# Patient Record
Sex: MALE | Race: WHITE | NOT HISPANIC OR LATINO | URBAN - METROPOLITAN AREA
[De-identification: names, ages, dates, MRNs, and addresses within clinical notes are randomized per-mention and may not be internally consistent; named-entity substitution may affect disease eponyms.]

---

## 2019-09-30 ENCOUNTER — EMERGENCY (EMERGENCY)
Facility: HOSPITAL | Age: 29
LOS: 1 days | Discharge: HOME | End: 2019-09-30
Attending: INTERNAL MEDICINE | Admitting: INTERNAL MEDICINE
Payer: MEDICAID

## 2019-09-30 VITALS
TEMPERATURE: 98 F | SYSTOLIC BLOOD PRESSURE: 138 MMHG | HEART RATE: 94 BPM | RESPIRATION RATE: 16 BRPM | DIASTOLIC BLOOD PRESSURE: 79 MMHG | OXYGEN SATURATION: 100 %

## 2019-09-30 PROCEDURE — 99283 EMERGENCY DEPT VISIT LOW MDM: CPT

## 2019-09-30 RX ORDER — IBUPROFEN 200 MG
1 TABLET ORAL
Qty: 30 | Refills: 0
Start: 2019-09-30

## 2019-09-30 RX ORDER — ACETAMINOPHEN 500 MG
650 TABLET ORAL ONCE
Refills: 0 | Status: COMPLETED | OUTPATIENT
Start: 2019-09-30 | End: 2019-09-30

## 2019-09-30 RX ORDER — IBUPROFEN 200 MG
600 TABLET ORAL ONCE
Refills: 0 | Status: COMPLETED | OUTPATIENT
Start: 2019-09-30 | End: 2019-09-30

## 2019-09-30 RX ADMIN — Medication 650 MILLIGRAM(S): at 16:44

## 2019-09-30 RX ADMIN — Medication 600 MILLIGRAM(S): at 17:13

## 2019-09-30 RX ADMIN — Medication 1 TABLET(S): at 17:13
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
## 2019-09-30 NOTE — ED PROVIDER NOTE - PHYSICAL EXAMINATION
Patient alert and oriented.  HENMT: No maxillary bone tenderness.  Nasal bridge is swollen and painful. Normal alignment.   No exposed bane or epistaxis seen in nares bilaterally.   Normal oral pharynx with no bleeding.   Normal dentition and tongue.   MSK: No skull tenderness or hematoma.  No loja signs.  C-spine non tender.  Full ROM with no pain or paresthesia.   No extremity bruising or tenderness. Patient alert and oriented.  HENMT: No maxillary bone tenderness.  Nasal bridge is swollen and painful. Normal alignment.   No exposed bone or epistaxis seen in nares bilaterally, no septal hematoma.  Normal oral pharynx with no bleeding.   Normal dentition, tongue, and oropharynx.   No skull tenderness or hematoma.  No loja sign or racoon eyes.  C-spine non tender and full ROM is intact with no pain or paresthesia.     MSK: No extremity bruising or tenderness.  No T r L spine tenderness.

## 2019-09-30 NOTE — ED PROVIDER NOTE - PROGRESS NOTE DETAILS
After calling his insurance company the yareli discovered that he is no longer insured and wishes to decline the CT scan at this time.  Through a detailed discussion he was made aware that there is a risk for cosmetic deformity and missed open fracture and accepts these risks: has capacity at this time.  He plans to follow-up as an outpatient within the next week and was given referral to otolaryngologists and primary medical care within the [facility] system.  Plan for empiric Augmentin as an open fracture has not been definitively ruled out at this time
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
## 2019-09-30 NOTE — ED PROVIDER NOTE - NSFOLLOWUPINSTRUCTIONS_ED_ALL_ED_FT
Please follow up with one of the primary medical doctors to whom you were given referral within two days and one of the otolaryngologists to whom you were given referral within one week.  Please return to the emergency department if you develop fever, chills, nausea, vomiting, bleeding, or feel that your condition is worsening.    Nasal Fracture  A nasal fracture is a break or crack in the bones of the nose or the tissue that helps to form the nose (cartilage). Minor breaks do not require treatment and usually heal on their own after about one month. Serious breaks may require treatment that could include surgery.  What are the causes?  A nasal fracture is usually caused by the strong force of a direct hit to the nose (blunt injury). This type of injury often occurs from:  Playing a contact sport.Being involved in a car accident.Falling.Getting punched.What are the signs or symptoms?  Symptoms of this condition include:  Pain.Swelling of the nose.Bleeding from the nose.Bruising around the nose or bruising around the eyes (black eyes).Crooked appearance of the nose.How is this diagnosed?  This condition may be diagnosed based on a physical exam. During the exam, the health care provider will:  Gently feel the nose for signs of broken bones.Look inside the nostrils to check if there is a blood-filled swelling on the dividing wall between the nostrils (septal hematoma).An X-ray of the nose may be taken. Sometimes, an X-ray may not show a nasal fracture even when one is present. In some cases, X-rays or a CT scan may be taken again 1–5 days later after the swelling has gone down.  How is this treated?  Treatment for this condition depends on the severity of the injury.  Minor fractures that have not caused deformity often do not require treatment.For more serious fractures that have caused bones to move out of position, treatment may involve one of the following:  Repositioning the bones without surgery. The health care provider may be able to do this in his or her office after you are given medicine to numb the nasal area (local anesthetic).Surgery. If surgery is needed, it will be done after the swelling is gone. Surgery will stabilize and align the fracture.Follow these instructions at home:  Image       Image   Activity     Return to your normal activities as told by your health care provider. Ask your health care provider what activities are safe for you.Avoid contact sports for 3–4 weeks or as told by your health care provider.General instructions     If directed, put ice on the injured area:  Put ice in a plastic bag.Place a towel between your skin and the bag.Leave the ice on for 20 minutes, 2–3 times a day.Take over-the-counter and prescription medicines only as told by your health care provider.If your nose starts to bleed, sit in an upright position while you squeeze the soft parts of your nose against the dividing wall between your nostrils (septum) for 10 minutes.Try to avoid blowing your nose.Keep all follow-up visits as told by your health care provider. This is important.Contact a health care provider if:  Your pain increases or becomes severe.You continue to have nosebleeds.The shape of your nose does not return to normal within 5 days.You have pus draining out of your nose.Get help right away if:  You have bleeding from your nose that does not stop after you pinch your nostrils closed for 20 minutes and keep ice on your nose.You have clear fluid draining out of your nose.You notice swelling near the septum inside the nose. This swelling is a septal hematoma that must be drained to help prevent infection.You have difficulty moving your eyes.You have repeated vomiting.Summary  A nasal fracture is a break or crack in the bones or cartilage of the nose.The fracture is usually caused by a blunt injury to the nose.Symptoms include pain, swelling, and facial bruising.Nasal fractures may heal on their own, or your health care provider may need to move the bones back into proper position. In some cases, surgery may be needed.This information is not intended to replace advice given to you by your health care provider. Make sure you discuss any questions you have with your health care provider.

## 2019-09-30 NOTE — ED PROVIDER NOTE - PATIENT PORTAL LINK FT
You can access the FollowMyHealth Patient Portal offered by [facility] by registering at the following website: [email or web address]. By joining [facility]’s FollowMyHealth portal, you will also be able to view your health information using other applications (apps) compatible with our system.